# Patient Record
Sex: MALE | Race: OTHER | Employment: FULL TIME | URBAN - METROPOLITAN AREA
[De-identification: names, ages, dates, MRNs, and addresses within clinical notes are randomized per-mention and may not be internally consistent; named-entity substitution may affect disease eponyms.]

---

## 2017-03-16 ENCOUNTER — TELEPHONE (OUTPATIENT)
Dept: INTERNAL MEDICINE CLINIC | Age: 32
End: 2017-03-16

## 2017-03-16 DIAGNOSIS — L60.0 INGROWN NAIL: Primary | ICD-10-CM

## 2017-06-19 ENCOUNTER — HOSPITAL ENCOUNTER (EMERGENCY)
Age: 32
Discharge: HOME | End: 2017-06-20
Attending: EMERGENCY MEDICINE | Admitting: EMERGENCY MEDICINE
Payer: COMMERCIAL

## 2017-06-19 DIAGNOSIS — N20.0 KIDNEY STONE: Primary | ICD-10-CM

## 2017-06-19 PROCEDURE — 96374 THER/PROPH/DIAG INJ IV PUSH: CPT

## 2017-06-19 PROCEDURE — 96375 TX/PRO/DX INJ NEW DRUG ADDON: CPT

## 2017-06-19 PROCEDURE — 96361 HYDRATE IV INFUSION ADD-ON: CPT

## 2017-06-19 PROCEDURE — 99283 EMERGENCY DEPT VISIT LOW MDM: CPT

## 2017-06-19 RX ORDER — ONDANSETRON 2 MG/ML
4 INJECTION INTRAMUSCULAR; INTRAVENOUS
Status: COMPLETED | OUTPATIENT
Start: 2017-06-20 | End: 2017-06-20

## 2017-06-19 RX ORDER — KETOROLAC TROMETHAMINE 30 MG/ML
30 INJECTION, SOLUTION INTRAMUSCULAR; INTRAVENOUS
Status: COMPLETED | OUTPATIENT
Start: 2017-06-20 | End: 2017-06-20

## 2017-06-20 ENCOUNTER — APPOINTMENT (OUTPATIENT)
Dept: CT IMAGING | Age: 32
End: 2017-06-20
Attending: PHYSICIAN ASSISTANT
Payer: COMMERCIAL

## 2017-06-20 VITALS
TEMPERATURE: 98.2 F | DIASTOLIC BLOOD PRESSURE: 73 MMHG | RESPIRATION RATE: 16 BRPM | SYSTOLIC BLOOD PRESSURE: 124 MMHG | OXYGEN SATURATION: 98 % | WEIGHT: 170.19 LBS | HEIGHT: 68 IN | BODY MASS INDEX: 25.79 KG/M2 | HEART RATE: 78 BPM

## 2017-06-20 LAB
ALBUMIN SERPL BCP-MCNC: 4.5 G/DL (ref 3.5–5)
ALBUMIN/GLOB SERPL: 1.1 {RATIO} (ref 1.1–2.2)
ALP SERPL-CCNC: 73 U/L (ref 45–117)
ALT SERPL-CCNC: 34 U/L (ref 12–78)
ANION GAP BLD CALC-SCNC: 9 MMOL/L (ref 5–15)
APPEARANCE UR: ABNORMAL
AST SERPL W P-5'-P-CCNC: 19 U/L (ref 15–37)
BACTERIA URNS QL MICRO: NEGATIVE /HPF
BASOPHILS # BLD AUTO: 0 K/UL (ref 0–0.1)
BASOPHILS # BLD: 0 % (ref 0–1)
BILIRUB SERPL-MCNC: 0.4 MG/DL (ref 0.2–1)
BILIRUB UR QL: NEGATIVE
BUN SERPL-MCNC: 15 MG/DL (ref 6–20)
BUN/CREAT SERPL: 16 (ref 12–20)
CALCIUM SERPL-MCNC: 9.2 MG/DL (ref 8.5–10.1)
CHLORIDE SERPL-SCNC: 104 MMOL/L (ref 97–108)
CO2 SERPL-SCNC: 31 MMOL/L (ref 21–32)
COLOR UR: ABNORMAL
CREAT SERPL-MCNC: 0.91 MG/DL (ref 0.7–1.3)
EOSINOPHIL # BLD: 0.1 K/UL (ref 0–0.4)
EOSINOPHIL NFR BLD: 1 % (ref 0–7)
EPITH CASTS URNS QL MICRO: ABNORMAL /LPF
ERYTHROCYTE [DISTWIDTH] IN BLOOD BY AUTOMATED COUNT: 12.9 % (ref 11.5–14.5)
GLOBULIN SER CALC-MCNC: 4.2 G/DL (ref 2–4)
GLUCOSE SERPL-MCNC: 115 MG/DL (ref 65–100)
GLUCOSE UR STRIP.AUTO-MCNC: NEGATIVE MG/DL
HCT VFR BLD AUTO: 42.1 % (ref 36.6–50.3)
HGB BLD-MCNC: 14.7 G/DL (ref 12.1–17)
HGB UR QL STRIP: ABNORMAL
KETONES UR QL STRIP.AUTO: ABNORMAL MG/DL
LEUKOCYTE ESTERASE UR QL STRIP.AUTO: ABNORMAL
LYMPHOCYTES # BLD AUTO: 35 % (ref 12–49)
LYMPHOCYTES # BLD: 2.6 K/UL (ref 0.8–3.5)
MCH RBC QN AUTO: 28.3 PG (ref 26–34)
MCHC RBC AUTO-ENTMCNC: 34.9 G/DL (ref 30–36.5)
MCV RBC AUTO: 81.1 FL (ref 80–99)
MONOCYTES # BLD: 0.7 K/UL (ref 0–1)
MONOCYTES NFR BLD AUTO: 9 % (ref 5–13)
NEUTS SEG # BLD: 4.1 K/UL (ref 1.8–8)
NEUTS SEG NFR BLD AUTO: 55 % (ref 32–75)
NITRITE UR QL STRIP.AUTO: NEGATIVE
PH UR STRIP: 5 [PH] (ref 5–8)
PLATELET # BLD AUTO: 248 K/UL (ref 150–400)
POTASSIUM SERPL-SCNC: 3.6 MMOL/L (ref 3.5–5.1)
PROT SERPL-MCNC: 8.7 G/DL (ref 6.4–8.2)
PROT UR STRIP-MCNC: 100 MG/DL
RBC # BLD AUTO: 5.19 M/UL (ref 4.1–5.7)
RBC #/AREA URNS HPF: >100 /HPF (ref 0–5)
SODIUM SERPL-SCNC: 144 MMOL/L (ref 136–145)
SP GR UR REFRACTOMETRY: 1.02 (ref 1–1.03)
UA: UC IF INDICATED,UAUC: ABNORMAL
UROBILINOGEN UR QL STRIP.AUTO: 0.2 EU/DL (ref 0.2–1)
WBC # BLD AUTO: 7.5 K/UL (ref 4.1–11.1)
WBC URNS QL MICRO: ABNORMAL /HPF (ref 0–4)

## 2017-06-20 PROCEDURE — 74011250637 HC RX REV CODE- 250/637: Performed by: EMERGENCY MEDICINE

## 2017-06-20 PROCEDURE — 36415 COLL VENOUS BLD VENIPUNCTURE: CPT | Performed by: PHYSICIAN ASSISTANT

## 2017-06-20 PROCEDURE — 74011250636 HC RX REV CODE- 250/636

## 2017-06-20 PROCEDURE — 80053 COMPREHEN METABOLIC PANEL: CPT | Performed by: PHYSICIAN ASSISTANT

## 2017-06-20 PROCEDURE — 85025 COMPLETE CBC W/AUTO DIFF WBC: CPT | Performed by: PHYSICIAN ASSISTANT

## 2017-06-20 PROCEDURE — 81001 URINALYSIS AUTO W/SCOPE: CPT | Performed by: PHYSICIAN ASSISTANT

## 2017-06-20 PROCEDURE — 87086 URINE CULTURE/COLONY COUNT: CPT | Performed by: PHYSICIAN ASSISTANT

## 2017-06-20 PROCEDURE — 74177 CT ABD & PELVIS W/CONTRAST: CPT

## 2017-06-20 PROCEDURE — 74011250636 HC RX REV CODE- 250/636: Performed by: PHYSICIAN ASSISTANT

## 2017-06-20 PROCEDURE — 74011636320 HC RX REV CODE- 636/320: Performed by: RADIOLOGY

## 2017-06-20 RX ORDER — TAMSULOSIN HYDROCHLORIDE 0.4 MG/1
0.4 CAPSULE ORAL DAILY
Qty: 15 CAP | Refills: 0 | Status: SHIPPED | OUTPATIENT
Start: 2017-06-20 | End: 2017-07-05

## 2017-06-20 RX ORDER — ONDANSETRON 4 MG/1
4 TABLET, ORALLY DISINTEGRATING ORAL
Qty: 20 TAB | Refills: 0 | Status: SHIPPED | OUTPATIENT
Start: 2017-06-20 | End: 2019-02-01 | Stop reason: ALTCHOICE

## 2017-06-20 RX ORDER — OXYCODONE AND ACETAMINOPHEN 5; 325 MG/1; MG/1
1 TABLET ORAL
Qty: 10 TAB | Refills: 0 | Status: SHIPPED | OUTPATIENT
Start: 2017-06-20 | End: 2019-02-01 | Stop reason: ALTCHOICE

## 2017-06-20 RX ORDER — OXYCODONE AND ACETAMINOPHEN 5; 325 MG/1; MG/1
1 TABLET ORAL
Status: COMPLETED | OUTPATIENT
Start: 2017-06-20 | End: 2017-06-20

## 2017-06-20 RX ORDER — MORPHINE SULFATE 4 MG/ML
INJECTION, SOLUTION INTRAMUSCULAR; INTRAVENOUS
Status: DISCONTINUED
Start: 2017-06-20 | End: 2017-06-20 | Stop reason: HOSPADM

## 2017-06-20 RX ORDER — MORPHINE SULFATE 4 MG/ML
4 INJECTION, SOLUTION INTRAMUSCULAR; INTRAVENOUS
Status: COMPLETED | OUTPATIENT
Start: 2017-06-20 | End: 2017-06-20

## 2017-06-20 RX ADMIN — KETOROLAC TROMETHAMINE 30 MG: 30 INJECTION, SOLUTION INTRAMUSCULAR at 00:11

## 2017-06-20 RX ADMIN — MORPHINE SULFATE 4 MG: 4 INJECTION, SOLUTION INTRAMUSCULAR; INTRAVENOUS at 01:20

## 2017-06-20 RX ADMIN — OXYCODONE HYDROCHLORIDE AND ACETAMINOPHEN 1 TABLET: 5; 325 TABLET ORAL at 02:26

## 2017-06-20 RX ADMIN — ONDANSETRON 4 MG: 2 INJECTION INTRAMUSCULAR; INTRAVENOUS at 00:11

## 2017-06-20 RX ADMIN — SODIUM CHLORIDE 1000 ML: 900 INJECTION, SOLUTION INTRAVENOUS at 00:14

## 2017-06-20 RX ADMIN — IOPAMIDOL 96 ML: 755 INJECTION, SOLUTION INTRAVENOUS at 01:12

## 2017-06-20 NOTE — ED PROVIDER NOTES
HPI Comments: 28 y.o. male with past medical history significant for mild chronic colitis  who presents from home with chief complaint of abdominal pain. Pt c/o gradual onset LLQ cramping around 2100 tonight with N/V/D, back pain, chills, testicular pain, and myalgias. Pt has had one episode of diarrhea. Pt denies taking pain medications PTA. Pt denies blood in stool or vomit. Pt denies taking daily medications. There are no other acute medical concerns at this time. Social hx: nonsmoker, prior EtOH use  PCP: Saintclair Corrigan, MD    Note written by Billy Jones, as dictated by BOY Steen 12:01 AM      The history is provided by the patient. No  was used. Past Medical History:   Diagnosis Date    ALT (SGPT) level raised 1/28/14    ALT 89.  normal AST    Epididymitis 2007    ? given antibiotics    Hydrocele 1987    Lower abdominal pain 2007    recurrent. seen [facility]. Urology said \"scar tissue\" from hydrocele? US testicle normal.  med trial amytriptline affected mood    Vitamin D deficiency 1/28/14    vit D  9    WBC decreased 1/28/14    3.6       Past Surgical History:   Procedure Laterality Date    HX UROLOGICAL  age 2    hydrocele repair         Family History:   Problem Relation Age of Onset    Stroke Maternal Grandmother     Cancer Paternal Grandfather      pancreatic    Heart Disease Neg Hx     Heart Attack Neg Hx     High Cholesterol Father     Hypertension Neg Hx     Alcohol abuse Maternal Grandfather        Social History     Social History    Marital status:      Spouse name: Daryle Leiter Number of children: 1    Years of education: N/A     Occupational History     [employer] (water treatment)      Social History Main Topics    Smoking status: Never Smoker    Smokeless tobacco: Never Used    Alcohol use No      Comment: quit drinking 10/1/13.  was 20 per week    Drug use: No    Sexual activity: Yes     Other Topics Concern    Not on file     Social History Narrative    1 son age 3, 1 due 2/2015         ALLERGIES: Review of patient's allergies indicates no known allergies. Review of Systems   Constitutional: Positive for chills. HENT: Negative. Eyes: Negative. Respiratory: Negative. Cardiovascular: Negative. Gastrointestinal: Positive for abdominal pain, diarrhea, nausea and vomiting. Endocrine: Negative. Genitourinary: Positive for testicular pain. Musculoskeletal: Positive for back pain and myalgias. Skin: Negative. Allergic/Immunologic: Negative. Neurological: Negative. Hematological: Negative. Psychiatric/Behavioral: Negative. All other systems reviewed and are negative. Vitals:    06/19/17 2347   BP: 122/76   Pulse: 78   Resp: 16   Temp: 98.2 °F (36.8 °C)   SpO2: 98%   Weight: 77.2 kg (170 lb 3.1 oz)   Height: 5' 8\" (1.727 m)            Physical Exam   Constitutional: He is oriented to person, place, and time. He appears well-developed and well-nourished. HENT:   Head: Normocephalic and atraumatic. Right Ear: External ear normal.   Left Ear: External ear normal.   Mouth/Throat: Oropharynx is clear and moist. No oropharyngeal exudate. Eyes: Conjunctivae and EOM are normal. Pupils are equal, round, and reactive to light. Right eye exhibits no discharge. Left eye exhibits no discharge. No scleral icterus. Neck: Normal range of motion. Neck supple. No tracheal deviation present. No thyromegaly present. Cardiovascular: Normal rate, regular rhythm, normal heart sounds and intact distal pulses. No murmur heard. Pulmonary/Chest: Effort normal and breath sounds normal. No respiratory distress. He has no wheezes. He has no rales. Abdominal: Soft. Bowel sounds are normal. He exhibits no distension. There is tenderness in the left lower quadrant. There is no rebound and no guarding. Musculoskeletal: Normal range of motion. He exhibits no edema or tenderness. Lymphadenopathy:     He has no cervical adenopathy. Neurological: He is alert and oriented to person, place, and time. No cranial nerve deficit. Coordination normal.   Skin: Skin is warm. No rash noted. No erythema. Psychiatric: He has a normal mood and affect. His behavior is normal. Judgment and thought content normal.   Nursing note and vitals reviewed. MDM  Number of Diagnoses or Management Options  Kidney stone:   Diagnosis management comments: Assesment/Plan- 27 year old male with complaints of abdominal pain. Differential includes diverticulitis, colitis, kidney stone. Imaging and labs reviewed. Ct showing kidney stone. Patient discharged with Urology follow up.        Amount and/or Complexity of Data Reviewed  Clinical lab tests: ordered and reviewed  Tests in the radiology section of CPT®: ordered and reviewed  Tests in the medicine section of CPT®: reviewed and ordered  Discuss the patient with other providers: yes (Attending- Dr. Marc Farris)      ED Course       Procedures

## 2017-06-20 NOTE — ED NOTES
Pt calling Lorin Pate ride home, pt reviewed d/c plan with Dr. Ganesh Roe. Pt verbalized understanding of plan. NAD noted at time of d/c.

## 2017-06-20 NOTE — DISCHARGE INSTRUCTIONS
Kidney Stone: Care Instructions  Your Care Instructions    Kidney stones are formed when salts, minerals, and other substances normally found in the urine clump together. They can be as small as grains of sand or, rarely, as large as golf balls. While the stone is traveling through the ureter, which is the tube that carries urine from the kidney to the bladder, you will probably feel pain. The pain may be mild or very severe. You may also have some blood in your urine. As soon as the stone reaches the bladder, any intense pain should go away. If a stone is too large to pass on its own, you may need a medical procedure to help you pass the stone. The doctor has checked you carefully, but problems can develop later. If you notice any problems or new symptoms, get medical treatment right away. Follow-up care is a key part of your treatment and safety. Be sure to make and go to all appointments, and call your doctor if you are having problems. It's also a good idea to know your test results and keep a list of the medicines you take. How can you care for yourself at home? · Drink plenty of fluids, enough so that your urine is light yellow or clear like water. If you have kidney, heart, or liver disease and have to limit fluids, talk with your doctor before you increase the amount of fluids you drink. · Take pain medicines exactly as directed. Call your doctor if you think you are having a problem with your medicine. ¨ If the doctor gave you a prescription medicine for pain, take it as prescribed. ¨ If you are not taking a prescription pain medicine, ask your doctor if you can take an over-the-counter medicine. Read and follow all instructions on the label. · Your doctor may ask you to strain your urine so that you can collect your kidney stone when it passes. You can use a kitchen strainer or a tea strainer to catch the stone. Store it in a plastic bag until you see your doctor again.   Preventing future kidney stones  Some changes in your diet may help prevent kidney stones. Depending on the cause of your stones, your doctor may recommend that you:  · Drink plenty of fluids, enough so that your urine is light yellow or clear like water. If you have kidney, heart, or liver disease and have to limit fluids, talk with your doctor before you increase the amount of fluids you drink. · Limit coffee, tea, and alcohol. Also avoid grapefruit juice. · Do not take more than the recommended daily dose of vitamins C and D.  · Avoid antacids such as Gaviscon, Maalox, Mylanta, or Tums. · Limit the amount of salt (sodium) in your diet. · Eat a balanced diet that is not too high in protein. · Limit foods that are high in a substance called oxalate, which can cause kidney stones. These foods include dark green vegetables, rhubarb, chocolate, wheat bran, nuts, cranberries, and beans. When should you call for help? Call your doctor now or seek immediate medical care if:  · You cannot keep down fluids. · Your pain gets worse. · You have a fever or chills. · You have new or worse pain in your back just below your rib cage (the flank area). · You have new or more blood in your urine. Watch closely for changes in your health, and be sure to contact your doctor if:  · You do not get better as expected. Where can you learn more? Go to http://meaghan-brendan.info/. Enter X159 in the search box to learn more about \"Kidney Stone: Care Instructions. \"  Current as of: April 3, 2017  Content Version: 11.3  © 8652-0098 [employer]. Care instructions adapted under license by [employer] (which disclaims liability or warranty for this information). If you have questions about a medical condition or this instruction, always ask your healthcare professional. [address] any warranty or liability for your use of this information.          We hope that we have addressed all of your medical concerns. The examination and treatment you received in the Emergency Department were for an emergent problem and were not intended as complete care. It is important that you follow up with your healthcare provider(s) for ongoing care. If your symptoms worsen or do not improve as expected, and you are unable to reach your usual health care provider(s), you should return to the Emergency Department. Today's healthcare is undergoing tremendous change, and patient satisfaction surveys are one of the many tools to assess the quality of medical care. You may receive a survey from the [facility] regarding your experience in the Emergency Department. I hope that your experience has been completely positive, particularly the medical care that I provided. As such, please participate in the survey; anything less than excellent does not meet my expectations or intentions. [facility], [place] and [address] [place] [address] participate in nationally recognized quality of care measures. If your blood pressure is greater than 120/80, as reported below, we urge that you seek medical care to address the potential of high blood pressure, commonly known as hypertension. Hypertension can be hereditary or can be caused by certain medical conditions, pain, stress, or \"white coat syndrome. \"       Please make an appointment with your health care provider(s) for follow up of your Emergency Department visit. VITALS:   Patient Vitals for the past 8 hrs:   Temp Pulse Resp BP SpO2   06/19/17 2347 98.2 °F (36.8 °C) 78 16 122/76 98 %          Thank you for allowing us to provide you with medical care today. We realize that you have many choices for your emergency care needs. Please choose us in the future for any continued health care needs. Elodia Pereira, [address] [facility] [address]: [phone]            Recent Results (from the past 24 hour(s))   CBC WITH AUTOMATED DIFF    Collection Time: 06/20/17 12:10 AM   Result Value Ref Range    WBC 7.5 4.1 - 11.1 K/uL    RBC 5.19 4. 10 - 5.70 M/uL    HGB 14.7 12.1 - 17.0 g/dL    HCT 42.1 36.6 - 50.3 %    MCV 81.1 80.0 - 99.0 FL    MCH 28.3 26.0 - 34.0 PG    MCHC 34.9 30.0 - 36.5 g/dL    RDW 12.9 11.5 - 14.5 %    PLATELET 931 140 - 260 K/uL    NEUTROPHILS 55 32 - 75 %    LYMPHOCYTES 35 12 - 49 %    MONOCYTES 9 5 - 13 %    EOSINOPHILS 1 0 - 7 %    BASOPHILS 0 0 - 1 %    ABS. NEUTROPHILS 4.1 1.8 - 8.0 K/UL    ABS. LYMPHOCYTES 2.6 0.8 - 3.5 K/UL    ABS. MONOCYTES 0.7 0.0 - 1.0 K/UL    ABS. EOSINOPHILS 0.1 0.0 - 0.4 K/UL    ABS. BASOPHILS 0.0 0.0 - 0.1 K/UL   METABOLIC PANEL, COMPREHENSIVE    Collection Time: 06/20/17 12:10 AM   Result Value Ref Range    Sodium 144 136 - 145 mmol/L    Potassium 3.6 3.5 - 5.1 mmol/L    Chloride 104 97 - 108 mmol/L    CO2 31 21 - 32 mmol/L    Anion gap 9 5 - 15 mmol/L    Glucose 115 (H) 65 - 100 mg/dL    BUN 15 6 - 20 MG/DL    Creatinine 0.91 0.70 - 1.30 MG/DL    BUN/Creatinine ratio 16 12 - 20      GFR est AA >60 >60 ml/min/1.73m2    GFR est non-AA >60 >60 ml/min/1.73m2    Calcium 9.2 8.5 - 10.1 MG/DL    Bilirubin, total 0.4 0.2 - 1.0 MG/DL    ALT (SGPT) 34 12 - 78 U/L    AST (SGOT) 19 15 - 37 U/L    Alk. phosphatase 73 45 - 117 U/L    Protein, total 8.7 (H) 6.4 - 8.2 g/dL    Albumin 4.5 3.5 - 5.0 g/dL    Globulin 4.2 (H) 2.0 - 4.0 g/dL    A-G Ratio 1.1 1.1 - 2.2         No results found.

## 2017-06-21 ENCOUNTER — TELEPHONE (OUTPATIENT)
Dept: INTERNAL MEDICINE CLINIC | Age: 32
End: 2017-06-21

## 2017-06-21 DIAGNOSIS — N20.0 KIDNEY STONE: Primary | ICD-10-CM

## 2017-06-21 LAB
BACTERIA SPEC CULT: NORMAL
CC UR VC: NORMAL
SERVICE CMNT-IMP: NORMAL

## 2017-06-21 NOTE — TELEPHONE ENCOUNTER
The referral has been obtained from [facility] and faxed to Dr Cristhian Burrows office at [phone].   Paper auth  Unlimited visits  6/21/17-6/21/18

## 2017-06-21 NOTE — TELEPHONE ENCOUNTER
Referral    Dr Kati Ji  (urology)    [address]    Phone [phone]    Fax [phone]    Kidney Stones  [facility] [place]20[place]2017 06/22/2017      JENY [[id number]     [phone]

## 2018-08-29 ENCOUNTER — TELEPHONE (OUTPATIENT)
Dept: INTERNAL MEDICINE CLINIC | Age: 33
End: 2018-08-29

## 2018-08-29 DIAGNOSIS — S42.401A CLOSED FRACTURE OF DISTAL END OF RIGHT HUMERUS, UNSPECIFIED FRACTURE MORPHOLOGY, INITIAL ENCOUNTER: Primary | ICD-10-CM

## 2018-08-29 NOTE — TELEPHONE ENCOUNTER
----- Message from 56Sai Tony Chai sent at 8/29/2018 10:24 AM EDT -----  Regarding: Dr. Hamida Kauffman from [address] [place] [address] is requesting a call back in regards to an order for PT evaluations. Best contact number is, [phone].

## 2018-08-29 NOTE — TELEPHONE ENCOUNTER
----- Message from Kaela Dominique sent at 8/29/2018  9:16 AM EDT -----  Regarding: Dr. Jose Naranjo with [facility] is requesting a call back from Shy Espinosa or Norma Sullivan in regards to a referral for the patient.  Norma Sullivan stated that she would like to included Occupational Therapy to the referral. [phone]

## 2018-08-29 NOTE — TELEPHONE ENCOUNTER
[facility] calling for Referral,    Appt was Aug 27th for out patient pt.     Ph [phone] Gabe Barbosa    Fax [phone]    ICD Code- E49.184S Left Elbow fracture

## 2019-02-01 ENCOUNTER — OFFICE VISIT (OUTPATIENT)
Dept: INTERNAL MEDICINE CLINIC | Age: 34
End: 2019-02-01

## 2019-02-01 VITALS
OXYGEN SATURATION: 97 % | WEIGHT: 184.25 LBS | HEART RATE: 89 BPM | DIASTOLIC BLOOD PRESSURE: 78 MMHG | HEIGHT: 68 IN | RESPIRATION RATE: 18 BRPM | TEMPERATURE: 98.5 F | SYSTOLIC BLOOD PRESSURE: 112 MMHG | BODY MASS INDEX: 27.92 KG/M2

## 2019-02-01 DIAGNOSIS — S42.492S OTHER CLOSED DISPLACED FRACTURE OF DISTAL END OF LEFT HUMERUS, SEQUELA: ICD-10-CM

## 2019-02-01 DIAGNOSIS — R74.01 ALT (SGPT) LEVEL RAISED: ICD-10-CM

## 2019-02-01 DIAGNOSIS — Z87.19 HISTORY OF COLITIS: ICD-10-CM

## 2019-02-01 DIAGNOSIS — E55.9 VITAMIN D DEFICIENCY: ICD-10-CM

## 2019-02-01 DIAGNOSIS — D50.8 OTHER IRON DEFICIENCY ANEMIA: Primary | ICD-10-CM

## 2019-02-01 DIAGNOSIS — Z12.11 ENCOUNTER FOR SCREENING FECAL OCCULT BLOOD TESTING: ICD-10-CM

## 2019-02-01 PROBLEM — S42.402A CLOSED FRACTURE OF LEFT DISTAL HUMERUS: Status: ACTIVE | Noted: 2019-02-01

## 2019-02-01 RX ORDER — LANOLIN ALCOHOL/MO/W.PET/CERES
CREAM (GRAM) TOPICAL
COMMUNITY

## 2019-02-01 NOTE — PROGRESS NOTES
HISTORY OF PRESENT ILLNESS Chief Complaint Patient presents with  Establish Care  
  ? anemia s/p fall with Left elbow x4 surgeries Presents for follow-up. Last seen 1/2015, so he is a new patient again. Unfortunately, he sustained an injury to his left elbow when he fell off of a ladder onto his driveway while cleaning gutters in June 2018. He has required 4 separate surgeries for repair and removal of hardware, his last surgery was done by Dr. Viridiana Pacheco at [facility] in December. He is still wearing a soft brace on that arm and has some mild to moderate discomfort. He has relatively full range of motion. 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 He has been found to be anemic during this entire process. On December 11, 2018, blood work showed hemoglobin 10.1, MCV 82. Ferritin 39, and iron 28, TSH 0.59. He did have significant blood loss during a debridement and surgery and tells me that he required a transfusion one time. During one surgery, was told that his heart appears to be under some strain, but then was told that this was likely because of anemia, not because of a significant heart condition. He tells me that he was not told that further cardiac follow-up was needed. Denies any chest pain, edema, shortness of breath. He has been referred to me for further evaluation of his anemia. Patient does have a history of colitis which was diagnosed a in his early 25s. He has had one colonoscopy around 2008. Since that time, denies any abdominal pain, blood in stool, cramps. He does say that he has an anal fissure which sometimes may occasionally saw some  red blood on the tissue and some discomfort. Currently asymptomatic. He is taking an iron supplement for the past couple of weeks. Previous lab review shows no signs of anemia in 2015. Review of Systems All other systems reviewed and are negative, except as noted in HPI Past Medical and Surgical History has a past medical history of ALT (SGPT) level raised (1/28/14), Closed fracture of left distal humerus (06/2018), Colitis, Epididymitis (2007), Hydrocele (1987), Lower abdominal pain (2007), Vitamin D deficiency (1/28/14), and WBC decreased (1/28/14). has a past surgical history that includes hx urological (age 3) and hx orthopaedic (06/03/2018). reports that  has never smoked. he has never used smokeless tobacco. He reports that he does not drink alcohol or use drugs. family history includes Alcohol abuse in his maternal grandfather; Cancer in his paternal grandfather; High Cholesterol in his father; Stroke in his maternal grandmother. Physical Exam  
Nursing note and vitals reviewed. Blood pressure 112/78, pulse 89, temperature 98.5 °F (36.9 °C), temperature source Oral, resp. rate 18, height 5' 8\" (1.727 m), weight 184 lb 4 oz (83.6 kg), SpO2 97 %. Constitutional:  No distress. Eyes: Conjunctivae are normal.  
Ears:  Hearing grossly intact Cardiovascular: Normal rate. regular rhythm, no murmurs or gallops No edema Pulmonary/Chest: Effort normal.   CTAB Musculoskeletal: moves all 4 extremities Neurological: Alert and oriented to person, place, and time. Skin: No rash noted. Psychiatric: Normal mood and affect. Behavior is normal.  
 
ASSESSMENT and PLAN Diagnoses and all orders for this visit: 
 
1. Other iron deficiency anemia Likely due to surgical blood loss, but need to rule out gastrointestinal cause given history of colitis. Fissure may also cause intermittent bleeding but I would be surprised if it would cause him to have anemia. He is taking iron. Check fit test and refer to GI since he likely should have another colonoscopy anyway given his history of this in the past. 
-     CBC WITH AUTOMATED DIFF 
-     IRON PROFILE 
-     FERRITIN 
-     REFERRAL TO GASTROENTEROLOGY 2. Vitamin D deficiency Unclear current status. Check labs. -     VITAMIN D, 25 HYDROXY -     VITAMIN B12 & FOLATE 3. ALT (SGPT) level raised -     METABOLIC PANEL, COMPREHENSIVE 
-     LIPID PANEL 4. Other closed displaced fracture of distal end of left humerus, sequela Healing. Recovering from surgery, the last was about 6 weeks ago. 5. History of colitis He is largely asymptomatic, but his anemia may be because of chronic intestinal blood loss. I do recommend follow-up with GI. 
-     REFERRAL TO GASTROENTEROLOGY 6. Encounter for screening fecal occult blood testing 
-     OCCULT BLOOD IMMUNOASSAY,DIAGNOSTIC There are no Patient Instructions on file for this visit. 
 
lab results and schedule of future lab studies reviewed with patient 
reviewed medications and side effects in detail Return to clinic for further evaluation if new symptoms develop Follow-up Disposition: Not on File Current Outpatient Medications Medication Sig  
 ferrous sulfate (IRON) 325 mg (65 mg iron) tablet Take  by mouth Daily (before breakfast). No current facility-administered medications for this visit. Discussed the patient's BMI with him. The BMI follow up plan is as follows:  
 
dietary management education, guidance, and counseling 
encourage exercise 
monitor weight 
prescribed dietary intake An After Visit Summary was printed and given to the patient.

## 2019-02-13 LAB — HEMOCCULT STL QL IA: NEGATIVE

## 2019-02-15 ENCOUNTER — TELEPHONE (OUTPATIENT)
Dept: INTERNAL MEDICINE CLINIC | Age: 34
End: 2019-02-15

## 2019-02-15 NOTE — TELEPHONE ENCOUNTER
Per PCP pt needs an appt with Dr. Cristela Russ for iron deficiency anemia and hx of colitis. Appt schedule for March 7th at 12:15pm with Dr. Cristela Russ. Pt needs to arrive at 11:45am [facility] location. They will mail pt a new patient packet. Pt was informed and verbalized understanding. Note and labs faxed to [phone].

## 2019-02-23 LAB
25(OH)D3+25(OH)D2 SERPL-MCNC: 14 NG/ML (ref 30–100)
ALBUMIN SERPL-MCNC: 4.6 G/DL (ref 3.5–5.5)
ALBUMIN/GLOB SERPL: 1.7 {RATIO} (ref 1.2–2.2)
ALP SERPL-CCNC: 97 IU/L (ref 39–117)
ALT SERPL-CCNC: 31 IU/L (ref 0–44)
AST SERPL-CCNC: 20 IU/L (ref 0–40)
BASOPHILS # BLD AUTO: 0 X10E3/UL (ref 0–0.2)
BASOPHILS NFR BLD AUTO: 0 %
BILIRUB SERPL-MCNC: <0.2 MG/DL (ref 0–1.2)
BUN SERPL-MCNC: 11 MG/DL (ref 6–20)
BUN/CREAT SERPL: 14 (ref 9–20)
CALCIUM SERPL-MCNC: 9.8 MG/DL (ref 8.7–10.2)
CHLORIDE SERPL-SCNC: 102 MMOL/L (ref 96–106)
CHOLEST SERPL-MCNC: 234 MG/DL (ref 100–199)
CO2 SERPL-SCNC: 25 MMOL/L (ref 20–29)
CREAT SERPL-MCNC: 0.76 MG/DL (ref 0.76–1.27)
EOSINOPHIL # BLD AUTO: 0.1 X10E3/UL (ref 0–0.4)
EOSINOPHIL NFR BLD AUTO: 2 %
ERYTHROCYTE [DISTWIDTH] IN BLOOD BY AUTOMATED COUNT: 13.6 % (ref 12.3–15.4)
FERRITIN SERPL-MCNC: 57 NG/ML (ref 30–400)
FOLATE SERPL-MCNC: 13.5 NG/ML
GLOBULIN SER CALC-MCNC: 2.7 G/DL (ref 1.5–4.5)
GLUCOSE SERPL-MCNC: 100 MG/DL (ref 65–99)
HCT VFR BLD AUTO: 45.1 % (ref 37.5–51)
HDLC SERPL-MCNC: 46 MG/DL
HGB BLD-MCNC: 14 G/DL (ref 13–17.7)
IMM GRANULOCYTES # BLD AUTO: 0 X10E3/UL (ref 0–0.1)
IMM GRANULOCYTES NFR BLD AUTO: 0 %
INTERPRETATION, 910389: NORMAL
IRON SATN MFR SERPL: 17 % (ref 15–55)
IRON SERPL-MCNC: 52 UG/DL (ref 38–169)
LDLC SERPL CALC-MCNC: 151 MG/DL (ref 0–99)
LYMPHOCYTES # BLD AUTO: 2.3 X10E3/UL (ref 0.7–3.1)
LYMPHOCYTES NFR BLD AUTO: 41 %
MCH RBC QN AUTO: 26.8 PG (ref 26.6–33)
MCHC RBC AUTO-ENTMCNC: 31 G/DL (ref 31.5–35.7)
MCV RBC AUTO: 86 FL (ref 79–97)
MONOCYTES # BLD AUTO: 0.6 X10E3/UL (ref 0.1–0.9)
MONOCYTES NFR BLD AUTO: 10 %
NEUTROPHILS # BLD AUTO: 2.6 X10E3/UL (ref 1.4–7)
NEUTROPHILS NFR BLD AUTO: 47 %
PLATELET # BLD AUTO: 240 X10E3/UL (ref 150–379)
POTASSIUM SERPL-SCNC: 4.6 MMOL/L (ref 3.5–5.2)
PROT SERPL-MCNC: 7.3 G/DL (ref 6–8.5)
RBC # BLD AUTO: 5.23 X10E6/UL (ref 4.14–5.8)
SODIUM SERPL-SCNC: 143 MMOL/L (ref 134–144)
TIBC SERPL-MCNC: 302 UG/DL (ref 250–450)
TRIGL SERPL-MCNC: 185 MG/DL (ref 0–149)
UIBC SERPL-MCNC: 250 UG/DL (ref 111–343)
VIT B12 SERPL-MCNC: 610 PG/ML (ref 232–1245)
VLDLC SERPL CALC-MCNC: 37 MG/DL (ref 5–40)
WBC # BLD AUTO: 5.6 X10E3/UL (ref 3.4–10.8)

## 2019-02-24 RX ORDER — ASPIRIN 325 MG
50000 TABLET, DELAYED RELEASE (ENTERIC COATED) ORAL
Qty: 8 CAP | Refills: 0 | Status: SHIPPED | OUTPATIENT
Start: 2019-02-24 | End: 2019-04-15

## 2019-11-25 ENCOUNTER — TELEPHONE (OUTPATIENT)
Dept: INTERNAL MEDICINE CLINIC | Age: 34
End: 2019-11-25

## 2019-11-25 NOTE — TELEPHONE ENCOUNTER
Silverio Junior is requesting a doctor-to-doctor referral for pts appt on 12/2/19 with Dr. Mae Or for a vasectomy consult. Thanks.        Webster Antes   Physician   Urology    Phone [phone]   Fax: [phone] [address]     NATIONAL PROVIDER ID [[id number]] [id number]

## 2020-02-10 ENCOUNTER — OFFICE VISIT (OUTPATIENT)
Dept: INTERNAL MEDICINE CLINIC | Age: 35
End: 2020-02-10

## 2020-02-10 VITALS
BODY MASS INDEX: 27.17 KG/M2 | SYSTOLIC BLOOD PRESSURE: 115 MMHG | OXYGEN SATURATION: 98 % | HEIGHT: 68 IN | RESPIRATION RATE: 18 BRPM | HEART RATE: 99 BPM | WEIGHT: 179.25 LBS | DIASTOLIC BLOOD PRESSURE: 83 MMHG | TEMPERATURE: 98.7 F

## 2020-02-10 DIAGNOSIS — R68.89 FLU-LIKE SYMPTOMS: ICD-10-CM

## 2020-02-10 DIAGNOSIS — J11.1 INFLUENZA: Primary | ICD-10-CM

## 2020-02-10 LAB
FLUAV+FLUBV AG NOSE QL IA.RAPID: NEGATIVE POS/NEG
FLUAV+FLUBV AG NOSE QL IA.RAPID: POSITIVE POS/NEG
VALID INTERNAL CONTROL?: YES

## 2020-02-10 RX ORDER — IBUPROFEN 200 MG
TABLET ORAL
COMMUNITY

## 2020-02-10 RX ORDER — OSELTAMIVIR PHOSPHATE 75 MG/1
75 CAPSULE ORAL 2 TIMES DAILY
Qty: 10 CAP | Refills: 0 | Status: SHIPPED | OUTPATIENT
Start: 2020-02-10 | End: 2020-02-15

## 2020-02-10 NOTE — PROGRESS NOTES
Assessment and Plan   Diagnoses and all orders for this visit:    1. Influenza    2. Flu-like symptoms  -     AMB POC MINA INFLUENZA A/B TEST  -     oseltamivir (TAMIFLU) 75 mg capsule; Take 1 Cap by mouth two (2) times a day for 5 days. Influenza A positive. Will prescribe Tamiflu. Recommend symptomatic treatment with ibuprofen, Tylenol, Sudafed. Encourage fluids and rest. Instructed to give us a call if symptoms do not improve. Benefits, risks, possible drug interactions, and side effects of all new medications were reviewed with the patient. Pt verbalized understanding. Return to clinic: As needed    Katlin Burgos MD  [facility]  2/10/2020    No future appointments. Subjective   Chief Complaint   Flulike symptoms    Shreya Tobar is a 28 y.o. male     Presents to clinic for a 3-day history of fever up to 103, body aches, chills, headaches, dry cough, mild nausea, left ear pain, shortness of breath, sinus congestion and drainage, difficulty sleeping, malaise, fatigue. Denies any abdominal pain, diarrhea. No travel out of the country. Review of Systems   Constitutional: Positive for chills and fever. Respiratory: Positive for cough and shortness of breath. Cardiovascular: Negative for chest pain. Objective   Vitals:       Visit Vitals  /83 (BP 1 Location: Left arm, BP Patient Position: Sitting)   Pulse 99   Temp 98.7 °F (37.1 °C) (Oral)   Resp 18   Ht 5' 8\" (1.727 m)   Wt 179 lb 4 oz (81.3 kg)   SpO2 98%   BMI 27.25 kg/m²        Physical Exam  Constitutional:       Comments: Appears uncomfortable   HENT:      Right Ear: Tympanic membrane, ear canal and external ear normal.      Left Ear: Tympanic membrane, ear canal and external ear normal.      Nose: Congestion present. Mouth/Throat:      Pharynx: No posterior oropharyngeal erythema.    Eyes:      Conjunctiva/sclera: Conjunctivae normal.   Cardiovascular:      Rate and Rhythm: Normal rate and regular rhythm. Heart sounds: No murmur. No friction rub. No gallop. Pulmonary:      Effort: No respiratory distress. Breath sounds: No wheezing, rhonchi or rales. Lymphadenopathy:      Cervical: No cervical adenopathy. Neurological:      Mental Status: He is alert.           Voice recognition software is utilized and this note may contain transcription errors

## 2022-03-19 PROBLEM — S42.402A CLOSED FRACTURE OF LEFT DISTAL HUMERUS: Status: ACTIVE | Noted: 2019-02-01

## 2023-05-18 RX ORDER — FERROUS SULFATE 325(65) MG
TABLET ORAL
COMMUNITY

## 2023-05-18 RX ORDER — IBUPROFEN 200 MG
TABLET ORAL
COMMUNITY